# Patient Record
Sex: FEMALE | Race: WHITE | NOT HISPANIC OR LATINO
[De-identification: names, ages, dates, MRNs, and addresses within clinical notes are randomized per-mention and may not be internally consistent; named-entity substitution may affect disease eponyms.]

---

## 2017-01-31 ENCOUNTER — APPOINTMENT (OUTPATIENT)
Dept: HUMAN REPRODUCTION | Facility: CLINIC | Age: 37
End: 2017-01-31

## 2017-04-17 ENCOUNTER — APPOINTMENT (OUTPATIENT)
Dept: FAMILY MEDICINE | Facility: CLINIC | Age: 37
End: 2017-04-17

## 2017-04-17 VITALS
OXYGEN SATURATION: 100 % | HEART RATE: 75 BPM | WEIGHT: 121 LBS | SYSTOLIC BLOOD PRESSURE: 110 MMHG | BODY MASS INDEX: 22.84 KG/M2 | DIASTOLIC BLOOD PRESSURE: 78 MMHG | RESPIRATION RATE: 16 BRPM | HEIGHT: 61 IN

## 2017-04-17 DIAGNOSIS — Z78.9 OTHER SPECIFIED HEALTH STATUS: ICD-10-CM

## 2017-04-17 DIAGNOSIS — M41.20 OTHER IDIOPATHIC SCOLIOSIS, SITE UNSPECIFIED: ICD-10-CM

## 2017-04-17 DIAGNOSIS — Z00.00 ENCOUNTER FOR GENERAL ADULT MEDICAL EXAMINATION W/OUT ABNORMAL FINDINGS: ICD-10-CM

## 2017-04-17 DIAGNOSIS — Z82.3 FAMILY HISTORY OF STROKE: ICD-10-CM

## 2017-10-20 ENCOUNTER — CLINICAL ADVICE (OUTPATIENT)
Age: 37
End: 2017-10-20

## 2020-08-21 ENCOUNTER — APPOINTMENT (OUTPATIENT)
Dept: PLASTIC SURGERY | Facility: CLINIC | Age: 40
End: 2020-08-21

## 2020-08-21 ENCOUNTER — OUTPATIENT (OUTPATIENT)
Dept: OUTPATIENT SERVICES | Facility: HOSPITAL | Age: 40
LOS: 1 days | End: 2020-08-21

## 2020-08-21 VITALS
SYSTOLIC BLOOD PRESSURE: 111 MMHG | DIASTOLIC BLOOD PRESSURE: 68 MMHG | HEART RATE: 70 BPM | TEMPERATURE: 98.7 F | BODY MASS INDEX: 23.43 KG/M2 | WEIGHT: 124 LBS | RESPIRATION RATE: 14 BRPM

## 2020-08-21 DIAGNOSIS — Z01.89 ENCOUNTER FOR OTHER SPECIFIED SPECIAL EXAMINATIONS: ICD-10-CM

## 2020-08-21 NOTE — PHYSICAL EXAM
[de-identified] : N [de-identified] : Moderate rectus diastasis, no hernias palpable. No abdominal scars. Moderate skin/soft tissue excess, primarily infraumbilical.

## 2020-08-21 NOTE — HISTORY OF PRESENT ILLNESS
[FreeTextEntry1] : 40 yo female  desiring improvement of abdominal contour. She has no medical problems, history of scoliosis and scoliosis surgery. Does not take any medications. No desire for additional children

## 2020-09-16 RX ORDER — DIAZEPAM 5 MG
1 TABLET ORAL
Qty: 15 | Refills: 0
Start: 2020-09-16

## 2020-09-16 RX ORDER — OXYCODONE HYDROCHLORIDE 5 MG/1
1 TABLET ORAL
Qty: 30 | Refills: 0
Start: 2020-09-16

## 2020-09-17 DIAGNOSIS — Z01.818 ENCOUNTER FOR OTHER PREPROCEDURAL EXAMINATION: ICD-10-CM

## 2020-09-18 ENCOUNTER — APPOINTMENT (OUTPATIENT)
Dept: DISASTER EMERGENCY | Facility: CLINIC | Age: 40
End: 2020-09-18

## 2020-09-19 LAB — SARS-COV-2 N GENE NPH QL NAA+PROBE: NOT DETECTED

## 2020-09-20 ENCOUNTER — TRANSCRIPTION ENCOUNTER (OUTPATIENT)
Age: 40
End: 2020-09-20

## 2020-09-21 ENCOUNTER — OUTPATIENT (OUTPATIENT)
Dept: OUTPATIENT SERVICES | Facility: HOSPITAL | Age: 40
LOS: 1 days | Discharge: HOME | End: 2020-09-21

## 2020-09-22 NOTE — PHYSICAL EXAM
[de-identified] : NAD, well appearing [de-identified] : Incision CDI, abdomen soft, no collections. Drain outputs serosanguinous

## 2020-09-22 NOTE — HISTORY OF PRESENT ILLNESS
[FreeTextEntry1] : Patient seen post op day 1 from lipoabdominoplasty in hotel room. She had an uneventful night and pain was controlled. She states that she feels well and is otherwise just a little sore. Denies nausea/vomiting.

## 2020-09-22 NOTE — ASSESSMENT
[FreeTextEntry1] : Patient doing well POD1 from lipoabdominoplasty. She will continue drains and binder at home. She has instructions. She will follow up next Monday.

## 2020-09-25 ENCOUNTER — OUTPATIENT (OUTPATIENT)
Dept: OUTPATIENT SERVICES | Facility: HOSPITAL | Age: 40
LOS: 1 days | End: 2020-09-25

## 2020-09-25 ENCOUNTER — APPOINTMENT (OUTPATIENT)
Dept: PLASTIC SURGERY | Facility: CLINIC | Age: 40
End: 2020-09-25

## 2020-09-25 NOTE — ASSESSMENT
[FreeTextEntry1] : Doing well s/p lipoabdominoplasty. 1 drain removed today from right side. Left side likely to be removed next week.\par - Will follow up next week to remove second drain\par

## 2020-09-25 NOTE — PHYSICAL EXAM
[de-identified] : NAD, well appearing female.  [de-identified] : Incision CDI. Drain outputs serosanguinous. No palpable collections. Significant flank ecchymosis

## 2020-09-25 NOTE — HISTORY OF PRESENT ILLNESS
[FreeTextEntry1] : 40 year old female s/p lipoabdominoplasty on 9/21. Doing well, says her pain is well controlled and she is ambulating without issue.

## 2020-09-28 DIAGNOSIS — Z09 ENCOUNTER FOR FOLLOW-UP EXAMINATION AFTER COMPLETED TREATMENT FOR CONDITIONS OTHER THAN MALIGNANT NEOPLASM: ICD-10-CM

## 2020-09-30 ENCOUNTER — OUTPATIENT (OUTPATIENT)
Dept: OUTPATIENT SERVICES | Facility: HOSPITAL | Age: 40
LOS: 1 days | End: 2020-09-30

## 2020-09-30 ENCOUNTER — APPOINTMENT (OUTPATIENT)
Dept: PLASTIC SURGERY | Facility: CLINIC | Age: 40
End: 2020-09-30

## 2020-09-30 NOTE — ASSESSMENT
[FreeTextEntry1] : 40 year old female doing well now week and a half out from abdominoplasty. Last drain was removed today due to minimal drain output of <10 cc per day. She understands now that she should start wearing her compression garment and she should avoid any heavy lifting for another 3 weeks. \par - Follow up in 1 month

## 2020-09-30 NOTE — PHYSICAL EXAM
[de-identified] : NAD [de-identified] : Incision CDI, no palpable collections, improving ecchymosis on the flanks.

## 2020-09-30 NOTE — HISTORY OF PRESENT ILLNESS
[FreeTextEntry1] : Patient doing well, says the drain is putting out <10 cc per day. No fevers, chills. Tolerating diet without issue, ambulating without issue.

## 2020-10-02 DIAGNOSIS — Z09 ENCOUNTER FOR FOLLOW-UP EXAMINATION AFTER COMPLETED TREATMENT FOR CONDITIONS OTHER THAN MALIGNANT NEOPLASM: ICD-10-CM

## 2020-11-13 ENCOUNTER — APPOINTMENT (OUTPATIENT)
Dept: PLASTIC SURGERY | Facility: CLINIC | Age: 40
End: 2020-11-13

## 2020-11-13 ENCOUNTER — OUTPATIENT (OUTPATIENT)
Dept: OUTPATIENT SERVICES | Facility: HOSPITAL | Age: 40
LOS: 1 days | End: 2020-11-13

## 2021-04-16 ENCOUNTER — APPOINTMENT (OUTPATIENT)
Dept: PLASTIC SURGERY | Facility: CLINIC | Age: 41
End: 2021-04-16

## 2022-04-11 PROBLEM — M41.20 SCOLIOSIS (AND KYPHOSCOLIOSIS), IDIOPATHIC: Status: ACTIVE | Noted: 2017-04-17
